# Patient Record
Sex: FEMALE | Race: WHITE | NOT HISPANIC OR LATINO | URBAN - METROPOLITAN AREA
[De-identification: names, ages, dates, MRNs, and addresses within clinical notes are randomized per-mention and may not be internally consistent; named-entity substitution may affect disease eponyms.]

---

## 2022-09-27 ENCOUNTER — OFFICE (OUTPATIENT)
Dept: URBAN - METROPOLITAN AREA CLINIC 102 | Facility: CLINIC | Age: 77
End: 2022-09-27

## 2022-09-27 VITALS
HEIGHT: 62 IN | SYSTOLIC BLOOD PRESSURE: 124 MMHG | DIASTOLIC BLOOD PRESSURE: 70 MMHG | HEART RATE: 72 BPM | TEMPERATURE: 98.1 F | WEIGHT: 99 LBS

## 2022-09-27 DIAGNOSIS — D50.9 IRON DEFICIENCY ANEMIA, UNSPECIFIED: ICD-10-CM

## 2022-09-27 DIAGNOSIS — K59.09 OTHER CONSTIPATION: ICD-10-CM

## 2022-09-27 DIAGNOSIS — R63.4 ABNORMAL WEIGHT LOSS: ICD-10-CM

## 2022-09-27 DIAGNOSIS — D75.839 THROMBOCYTOSIS, UNSPECIFIED: ICD-10-CM

## 2022-09-27 PROCEDURE — 00031: CPT | Performed by: INTERNAL MEDICINE

## 2022-09-27 PROCEDURE — 99204 OFFICE O/P NEW MOD 45 MIN: CPT

## 2022-09-27 NOTE — SERVICEHPINOTES
NADINE WIGGINS   is a   77   year old    female who is being seen in consultation at the request of   CARLYLE VAN   for anemia. +Hx CKD and hypothyroidsm. Per PCP records, Hgb ~10 at least since 2/2021 but with normal RBC indices. Labs from earlier this month cw iron deficiency anemia. Patient denies overt signs/symptoms of GIB. Notes her bowel have always been "slow", she has 1-3 BMs/week, although stools are not necessarily hard all the time. She does not take anything for this as it doesn't cause any abd pain or bloating. Denies hematochezia, melena. There is also chronic intermittent reflux that responds well to PPIs, has had dysphagia and globus sensation in the past but also resolved with reflux meds. Denies nausea, hematemesis, epigastric pain. Endorses increased fatigue and weight loss. Estimates she has lost about 20 lbs in the last 2 months, despite her po intake being pretty normal. No fevers, chills, night sweats.br 


br --9/13/2022: Hgb 7.5, MCV 81.1, Plt 819, Fe 20, Fe Sat 6&amp, Ferritin 9.br No upper or lower endoscopies -- per patient, a colonoscopy was attempted 10+yrs ago but provider informed her of inability to advance scope d/t "small intestine".br